# Patient Record
Sex: MALE | Race: WHITE
[De-identification: names, ages, dates, MRNs, and addresses within clinical notes are randomized per-mention and may not be internally consistent; named-entity substitution may affect disease eponyms.]

---

## 2019-02-14 ENCOUNTER — HOSPITAL ENCOUNTER (EMERGENCY)
Dept: HOSPITAL 80 - FED | Age: 24
Discharge: HOME | End: 2019-02-14
Payer: COMMERCIAL

## 2019-02-14 VITALS — SYSTOLIC BLOOD PRESSURE: 126 MMHG | DIASTOLIC BLOOD PRESSURE: 71 MMHG

## 2019-02-14 DIAGNOSIS — F17.200: ICD-10-CM

## 2019-02-14 DIAGNOSIS — K80.20: Primary | ICD-10-CM

## 2019-02-14 DIAGNOSIS — E86.9: ICD-10-CM

## 2019-02-14 LAB — PLATELET # BLD: 251 10^3/UL (ref 150–400)

## 2019-02-14 NOTE — EDPHY
General


Time Seen by Provider: 02/14/19 19:50


Narrative: 





CLINICAL IMPRESSION: 


Cholelithiasis


_________________


ASSESSMENT/PLAN:


23-year-old male presents to the emergency department with acute right upper 

quadrant abdominal pain associated with nausea and 1 episode of nonbilious 

nonbloody vomiting this evening.  Patient arrives afebrile with stable vital 

signs.  Pain improved with IV analgesics.  Labs show a mild leukocytosis of 10.2

, no associated transaminitis or pancreatitis, electrolyte imbalance or 

metabolic disturbance.  He is tender to the right upper quadrant.  Ultrasound 

shows cholelithiasis with a negative sonographic Hung sign, no gallbladder 

wall thickening or radiologic evidence of acute cholecystitis.  On reassessment

, patient was pain free stating that he was hungry.  I do not believe he 

requires emergent evaluation for cholecystectomy.  Long discussion with patient 

and family regarding dietary changes in outpatient surgery follow-up.  Warning 

signs for return to emergency department sooner were outlined in person and 

discharge papers. 


_________________


DIFFERENTIAL DX:


Abdominal pain includes but not limited to acute appendicitis, diverticulitis, 

cholecystitis, pancreatitis, SBO, gastroenteritis, constipation 


_________________


ED PROCEDURES:


See lab and/or imaging results below  


_________________


ED COURSE:


Eight hundred fifteen:  Patient seen and assessed by myself.  Right upper 

quadrant and epigastric pain.  IV established, labs and ultrasound ordered, IV 

analgesics and antiemetics ordered.  NPO at this time pending ultrasound 

results.


9:25 p.m.:  Discussed radiology findings with Dr. Hudson.  Patient has 

cholelithiasis with non dilated gallbladder, non thickened wall, negative 

sonographic Hung sign.  No associated transaminitis, mild leukocytosis of 

10.2.


9:30 p.m..  Patient reassessed, feeling much better, states he is hungry.  

Ultrasound results reviewed.  Recommend outpatient general surgery 

consultation.  Long discussion regarding diet changes.  Patient is comfortable 

discharge.


_________________


CHIEF COMPLAINT:  Right upper quadrant abdominal pain, nausea and vomiting


_________________


HPI:


This is a 23-year-old otherwise healthy male presents to the emergency 

department with progressively worsening right upper quadrant and epigastric 

pain associated with nausea and vomiting that began earlier this evening.  

Patient reports a mild aching into the back.  No radiating pain to the chest or 

lower abdomen.  He had a bowel movement yesterday but has not had a bowel 

movement today.  He is passing gas without difficulties.  No reported diarrhea.

  No reported fever or chills.  He ate earlier today with no worsening pain but 

this afternoon foods seem to exacerbate and worsen his pain.  He reports no 

prior abdominal wall surgery.  No visible blood in the urine and no history of 

kidney stones.  He is otherwise healthy.  He drinks alcohol socially and has 

not had a drink for a month. 


_________________


PAST MEDICAL HISTORY: 


None reported


See nurse/triage notes for additional history if applicable 





Pertinent Past Surgical History:  None reported





Family History:  Noncontributory





Social History:  Otherwise healthy, here with his grandmother


_________________


REVIEW OF SYSTEMS:


All other systems negative


Constitutional:  No fever, no chills, positive for appetite change.


Cardiovascular:  No chest pain, no palpitations.


Respiratory:  No cough, no shortness of breath.


Gastrointestinal: Positive for abdominal pain, positive for vomiting, denies 

diarrhea.


Genitourinary:  No hematuria, dysuria, flank pain, pelvic pain


Musculoskeletal:  No back pain, joint swelling, joint pain, myalgias.


Skin:  No rashes, color change.


Neurological:  No headache, dizziness, weakness.





_________________


PHYSICAL EXAM:


General Appearance:  Alert, oriented, appropriate, cooperative, appears 

uncomfortable, especially when lying supine well hydrated, non-toxic appearing, 

VSS, no hypoxia.


Respiratory:  There are no retractions, lungs are clear to auscultation.


Cardiac:  Regular rate and rhythm, no murmurs or gallops.


Gastrointestinal: Abdomen is soft, tender to right upper quadrant and 

epigastrium, positive Hung sign, bowel sounds normal, no masses/hernia, no 

rigidity, guarding or focal peritoneal findings.


Neurological: [ Alert and oriented x 3


Skin: Warm, dry, no rashes, no nodules on palpation.





_________________


MEDICAL DECISION MAKING:


Patient was seen independently. Secondary supervising physician at time of 

evaluation was Dr. Siegel.


Diagnosis:  Cholelithiasis. New, requires workup


Summary:  See Assessment and Plan for summary of ED visit 


Clinical lab tests:  ordered / reviewed.


Independent visualization of images, tracing, or specimens:  Yes.


Discussed patient with another provider: Radiology





Patient Progress:  Improved. 





- Diagnostics


Imaging Results: 


 Imaging Impressions





Abdomen Ultrasound  02/14/19 20:03


Impression: Cholelithiasis with trace pericholecystic fluid. No wall thickening 

and sonographic Hung sign is negative. Findings are equivocal but favored not 

to represent acute cholecystitis. Would recommend correlation with laboratory 

and physical examination findings.


 


Findings and recommendations discussed with Bud Díaz  at 2124 hour, 2/ 14/2019.














- History


Smoking Status: Current every day smoker





- Objective


Vital Signs: 


 Initial Vital Signs











Temperature (C)  36.9 C   02/14/19 19:30


 


Heart Rate  56 L  02/14/19 19:30


 


Respiratory Rate  16   02/14/19 19:30


 


Blood Pressure  129/89 H  02/14/19 19:30


 


O2 Sat (%)  96   02/14/19 19:30








 











O2 Delivery Mode               Room Air














Allergies/Adverse Reactions: 


 





No Known Allergies Allergy (Verified 02/14/19 19:34)


 








Home Medications: 














 Medication  Instructions  Recorded


 


NK [No Known Home Meds]  02/14/19











Laboratory Results: 


 Laboratory Results





 02/14/19 19:56 





 02/14/19 19:56 





 











  02/14/19 02/14/19





  19:56 19:56


 


WBC    10.22 10^3/uL H 10^3/uL





    (3.80-9.50) 


 


RBC    4.84 10^6/uL 10^6/uL





    (4.40-6.38) 


 


Hgb    15.5 g/dL g/dL





    (13.7-17.5) 


 


Hct    46.0 % %





    (40.0-51.0) 


 


MCV    95.0 fL fL





    (81.5-99.8) 


 


MCH    32.0 pg pg





    (27.9-34.1) 


 


MCHC    33.7 g/dL g/dL





    (32.4-36.7) 


 


RDW    12.6 % %





    (11.5-15.2) 


 


Plt Count    251 10^3/uL 10^3/uL





    (150-400) 


 


MPV    9.9 fL fL





    (8.7-11.7) 


 


Neut % (Auto)    67.7 % %





    (39.3-74.2) 


 


Lymph % (Auto)    22.6 % %





    (15.0-45.0) 


 


Mono % (Auto)    8.0 % %





    (4.5-13.0) 


 


Eos % (Auto)    1.1 % %





    (0.6-7.6) 


 


Baso % (Auto)    0.3 % %





    (0.3-1.7) 


 


Nucleat RBC Rel Count    0.0 % %





    (0.0-0.2) 


 


Absolute Neuts (auto)    6.92 10^3/uL H 10^3/uL





    (1.70-6.50) 


 


Absolute Lymphs (auto)    2.31 10^3/uL 10^3/uL





    (1.00-3.00) 


 


Absolute Monos (auto)    0.82 10^3/uL H 10^3/uL





    (0.30-0.80) 


 


Absolute Eos (auto)    0.11 10^3/uL 10^3/uL





    (0.03-0.40) 


 


Absolute Basos (auto)    0.03 10^3/uL 10^3/uL





    (0.02-0.10) 


 


Absolute Nucleated RBC    0.00 10^3/uL 10^3/uL





    (0-0.01) 


 


Immature Gran %    0.3 % %





    (0.0-1.1) 


 


Immature Gran #    0.03 10^3/uL 10^3/uL





    (0.00-0.10) 


 


Sodium  136 mEq/L mEq/L  





   (135-145)  


 


Potassium  4.0 mEq/L mEq/L  





   (3.5-5.2)  


 


Chloride  101 mEq/L mEq/L  





   ()  


 


Carbon Dioxide  24 mEq/l mEq/l  





   (22-31)  


 


Anion Gap  11 mEq/L mEq/L  





   (6-14)  


 


BUN  15 mg/dL mg/dL  





   (7-23)  


 


Creatinine  1.1 mg/dL mg/dL  





   (0.7-1.3)  


 


Estimated GFR  > 60   





   


 


Glucose  98 mg/dL mg/dL  





   ()  


 


Calcium  9.9 mg/dL mg/dL  





   (8.5-10.4)  


 


Total Bilirubin  1.1 mg/dL mg/dL  





   (0.1-1.4)  


 


Conjugated Bilirubin  0.4 mg/dL mg/dL  





   (0.0-0.5)  


 


Unconjugated Bilirubin  0.7 mg/dL mg/dL  





   (0.0-1.1)  


 


AST  25 IU/L IU/L  





   (17-59)  


 


ALT  28 IU/L IU/L  





   (21-72)  


 


Alkaline Phosphatase  65 IU/L IU/L  





   ()  


 


Total Protein  8.7 g/dL H g/dL  





   (6.3-8.2)  


 


Albumin  5.1 g/dL H g/dL  





   (3.5-5.0)  


 


Lipase  72 IU/L IU/L  





   ()  











Medications Given: 


 








Discontinued Medications





Fentanyl (Sublimaze)  50 mcg IVP EDNOW ONE


   Stop: 02/14/19 20:04


   Last Admin: 02/14/19 20:20 Dose:  50 mcg


Sodium Chloride (Ns)  1,000 mls @ 0 mls/hr IV EDNOW ONE; Wide Open


   PRN Reason: Protocol


   Stop: 02/14/19 20:04


   Last Admin: 02/14/19 20:21 Dose:  1,000 mls


Ondansetron HCl (Zofran)  4 mg IVP EDNOW ONE


   Stop: 02/14/19 20:04


   Last Admin: 02/14/19 20:20 Dose:  4 mg








Departure





- Departure


Disposition: Home, Routine, Self-Care


Clinical Impression: 


Cholelithiasis


Qualifiers:


 Cholelithiasis location: gallbladder Cholecystitis presence: without 

cholecystitis Biliary obstruction: without biliary obstruction Qualified Code(s)

: K80.20 - Calculus of gallbladder without cholecystitis without obstruction





Condition: Good


Instructions:  Gallstones (ED)


Additional Instructions: 


DISCHARGE INSTRUCTIONS FROM YOUR DOCTOR 


Thank you for visiting our emergency department today. You were treated by a 

physician assistant today and your case was reviewed with our ED Attending 

physician.  Please keep in mind that discharge from the emergency department 

does not mean that there is nothing wrong - it simply means that we have not 

identified an emergency condition that requires further evaluation or treatment 

in the hospital. You should always plan to follow up with primary care for re-

evaluation of your condition in the next 2-3 days. If you have been referred to 

a specialist, please call as soon as possible (today or tomorrow) to schedule 

your follow up appointment at the appropriate time. 





PLEASE MAKE AN APPOINTMENT WITH THE GENERAL SURGEON TO DISCUSS GALLSTONES.  YOU 

DO NOT HAVE AN ACUTELY INFECTED GALLBLADDER REQUIRING EMERGENT SURGERY TONIGHT.

  LABS ARE REASSURING, LIVER ENZYMES ARE NORMAL, NO PANCREATITIS, AND YOU HAVE 

A VERY MILD ELEVATION IN YOUR INFECTION FIGHTING COUNT TO 10.2.  PLEASE STICK 

TO A LIQUID/BLAND DIET.  PLEASE CONTACT SURGERY TOMORROW TO REQUEST A FOLLOW-UP 

APPOINTMENT.  RETURN TO THE EMERGENCY DEPARTMENT SOONER FOR WORSENING OR 

PERSISTENT PAIN, PERSISTENT VOMITING, DEVELOPMENT OF FEVER OR CHILLS, INABILITY 

TO STAY HYDRATED, OR ANY OTHER CONCERNS. 





People present with illnesses and injuries in different ways, and it is always 

possible that we have missed something. You may always return for re-evaluation 

if symptoms worsen or if they are not improving or if you develop new/different 

symptoms. 


Again, thank you for choosing our emergency department. We hope that you feel 

better.


Referrals: 


Alicia Reyes PA [Primary Care Provider] - As per Instructions


Thu Maria MD [Medical Doctor] - 1-2 days without fail